# Patient Record
Sex: MALE | Race: WHITE | HISPANIC OR LATINO | Employment: STUDENT | URBAN - METROPOLITAN AREA
[De-identification: names, ages, dates, MRNs, and addresses within clinical notes are randomized per-mention and may not be internally consistent; named-entity substitution may affect disease eponyms.]

---

## 2017-05-22 ENCOUNTER — TELEPHONE (OUTPATIENT)
Dept: FAMILY MEDICINE | Facility: CLINIC | Age: 22
End: 2017-05-22

## 2017-05-22 DIAGNOSIS — F41.8 DEPRESSION WITH ANXIETY: Primary | ICD-10-CM

## 2017-05-22 NOTE — LETTER
My Depression Action Plan  Name: Jonathan Lacey   Date of Birth 1995  Date: 5/22/2017    My doctor: Ty Franks   My clinic: [address] [address] [phone]          GREEN    ZONE   Good Control    What it looks like:     Things are going generally well. You have normal up s and down s. You may even feel depressed from time to time, but bad moods usually last less than a day.   What you need to do:  1. Continue to care for yourself (see self care plan)  2. Check your depression survival kit and update it as needed  3. Follow your physician s recommendations including any medication.  4. Do not stop taking medication unless you consult with your physician first.           YELLOW         ZONE Getting Worse    What it looks like:     Depression is starting to interfere with your life.     It may be hard to get out of bed; you may be starting to isolate yourself from others.    Symptoms of depression are starting to last most all day and this has happened for several days.     You may have suicidal thoughts but they are not constant.   What you need to do:     1. Call your care team, your response to treatment will improve if you keep your care team informed of your progress. Yellow periods are signs an adjustment may need to be made.     2. Continue your self-care, even if you have to fake it!    3. Talk to someone in your support network    4. Open up your depression survival kit           RED    ZONE Medical Alert - Get Help    What it looks like:     Depression is seriously interfering with your life.     You may experience these or other symptoms: You can t get out of bed most days, can t work or engage in other necessary activities, you have trouble taking care of basic hygiene, or basic responsibilities, thoughts of suicide or death that will not go away, self-injurious behavior.     What you need to do:  1. Call your care team  and request a same-day appointment. If they are not available (weekends or after hours) call your local crisis line, emergency room or 911.      Electronically signed by: Padmini Medellin, May 22, 2017    Depression Self Care Plan / Survival Kit    Self-Care for Depression  Here s the deal. Your body and mind are really not as separate as most people think.  What you do and think affects how you feel and how you feel influences what you do and think. This means if you do things that people who feel good do, it will help you feel better.  Sometimes this is all it takes.  There is also a place for medication and therapy depending on how severe your depression is, so be sure to consult with your medical provider and/ or Behavioral Health Consultant if your symptoms are worsening or not improving.     In order to better manage my stress, I will:    Exercise  Get some form of exercise, every day. This will help reduce pain and release endorphins, the  feel good  chemicals in your brain. This is almost as good as taking antidepressants!  This is not the same as joining a gym and then never going! (they count on that by the way ) It can be as simple as just going for a walk or doing some gardening, anything that will get you moving.      Hygiene   Maintain good hygiene (Get out of bed in the morning, Make your bed, Brush your teeth, Take a shower, and Get dressed like you were going to work, even if you are unemployed).  If your clothes don't fit try to get ones that do.    Diet  I will strive to eat foods that are good for me, drink plenty of water, and avoid excessive sugar, caffeine, alcohol, and other mood-altering substances.  Some foods that are helpful in depression are: complex carbohydrates, B vitamins, flaxseed, fish or fish oil, fresh fruits and vegetables.    Psychotherapy  I agree to participate in Individual Therapy (if recommended).    Medication  If prescribed medications, I agree to take them.  Missing doses  can result in serious side effects.  I understand that drinking alcohol, or other illicit drug use, may cause potential side effects.  I will not stop my medication abruptly without first discussing it with my provider.    Staying Connected With Others  I will stay in touch with my friends, family members, and my primary care provider/team.    Use your imagination  Be creative.  We all have a creative side; it doesn t matter if it s oil painting, sand castles, or mud pies! This will also kick up the endorphins.    Witness Beauty  (AKA stop and smell the roses) Take a look outside, even in mid-winter. Notice colors, textures. Watch the squirrels and birds.     Service to others  Be of service to others.  There is always someone else in need.  By helping others we can  get out of ourselves  and remember the really important things.  This also provides opportunities for practicing all the other parts of the program.    Humor  Laugh and be silly!  Adjust your TV habits for less news and crime-drama and more comedy.    Control your stress  Try breathing deep, massage therapy, biofeedback, and meditation. Find time to relax each day.     My support system    Clinic Contact:  Phone number:    Contact 1:  Phone number:    Contact 2:  Phone number:    Oriental orthodox/:  Phone number:    Therapist:  Phone number:    Local crisis center:    Phone number:    Other community support:  Phone number:

## 2017-05-22 NOTE — TELEPHONE ENCOUNTER
Phone, spoke to patient.  Panel Management Review      Patient has the following on his problem list:     Depression / Dysthymia review  PHQ-9 SCORE 12/8/2014 11/2/2015 5/22/2017   Total Score 14 - -   Total Score - 23 4      Patient is due for:  PHQ9 and DAP      Composite cancer screening  Chart review shows that this patient is due/due soon for the following None  Summary:    Patient is due/failing the following:   DAP and PHQ9    Action needed:   Patient needs office visit for depression pt states he goes to [place] for his depression. and Patient needs to do PHQ9.    Type of outreach:and he now goes to [facility] for his depression. he did do a [id number], spoke to patient.  and he now goes to [facility] for his depression. he did do a PHQ9    Questions for provider review:    None                                                                                           Chart routed to Care Team .

## 2017-05-23 ASSESSMENT — PATIENT HEALTH QUESTIONNAIRE - PHQ9: SUM OF ALL RESPONSES TO PHQ QUESTIONS 1-9: 4

## 2022-01-01 ENCOUNTER — HOSPITAL ENCOUNTER (EMERGENCY)
Facility: CLINIC | Age: 27
Discharge: HOME | End: 2022-07-23
Attending: EMERGENCY MEDICINE | Admitting: EMERGENCY MEDICINE
Payer: COMMERCIAL

## 2022-01-01 ENCOUNTER — APPOINTMENT (OUTPATIENT)
Dept: CT IMAGING | Facility: CLINIC | Age: 27
End: 2022-01-01
Attending: EMERGENCY MEDICINE
Payer: COMMERCIAL

## 2022-01-01 ENCOUNTER — OFFICE VISIT (OUTPATIENT)
Dept: FAMILY MEDICINE | Facility: CLINIC | Age: 27
End: 2022-01-01
Payer: COMMERCIAL

## 2022-01-01 VITALS
OXYGEN SATURATION: 100 % | HEART RATE: 89 BPM | BODY MASS INDEX: 30.99 KG/M2 | SYSTOLIC BLOOD PRESSURE: 134 MMHG | RESPIRATION RATE: 20 BRPM | TEMPERATURE: 97.9 F | DIASTOLIC BLOOD PRESSURE: 97 MMHG | WEIGHT: 206.8 LBS

## 2022-01-01 VITALS
RESPIRATION RATE: 18 BRPM | DIASTOLIC BLOOD PRESSURE: 82 MMHG | WEIGHT: 204.4 LBS | BODY MASS INDEX: 30.63 KG/M2 | TEMPERATURE: 97 F | HEART RATE: 80 BPM | SYSTOLIC BLOOD PRESSURE: 116 MMHG

## 2022-01-01 DIAGNOSIS — F40.01 PANIC DISORDER WITH AGORAPHOBIA AND MODERATE PANIC ATTACKS: ICD-10-CM

## 2022-01-01 DIAGNOSIS — G44.229 CHRONIC TENSION-TYPE HEADACHE, NOT INTRACTABLE: Primary | ICD-10-CM

## 2022-01-01 DIAGNOSIS — F33.1 MODERATE EPISODE OF RECURRENT MAJOR DEPRESSIVE DISORDER (H): Primary | ICD-10-CM

## 2022-01-01 DIAGNOSIS — R51.9 ACUTE NONINTRACTABLE HEADACHE, UNSPECIFIED HEADACHE TYPE: ICD-10-CM

## 2022-01-01 DIAGNOSIS — F41.9 ANXIETY: ICD-10-CM

## 2022-01-01 DIAGNOSIS — G89.29 CHRONIC RIGHT-SIDED LOW BACK PAIN WITHOUT SCIATICA: ICD-10-CM

## 2022-01-01 DIAGNOSIS — E61.1 IRON DEFICIENCY: ICD-10-CM

## 2022-01-01 DIAGNOSIS — M54.50 CHRONIC RIGHT-SIDED LOW BACK PAIN WITHOUT SCIATICA: ICD-10-CM

## 2022-01-01 LAB
ANION GAP SERPL CALCULATED.3IONS-SCNC: 8 MMOL/L (ref 3–14)
BASOPHILS # BLD AUTO: 0 10E3/UL (ref 0–0.2)
BASOPHILS NFR BLD AUTO: 0 %
BUN SERPL-MCNC: 10 MG/DL (ref 7–30)
CALCIUM SERPL-MCNC: 9.3 MG/DL (ref 8.5–10.1)
CHLORIDE BLD-SCNC: 103 MMOL/L (ref 94–109)
CO2 SERPL-SCNC: 28 MMOL/L (ref 20–32)
CREAT SERPL-MCNC: 0.8 MG/DL (ref 0.66–1.25)
EOSINOPHIL # BLD AUTO: 0 10E3/UL (ref 0–0.7)
EOSINOPHIL NFR BLD AUTO: 0 %
ERYTHROCYTE [DISTWIDTH] IN BLOOD BY AUTOMATED COUNT: 12.1 % (ref 10–15)
GFR SERPL CREATININE-BSD FRML MDRD: >90 ML/MIN/1.73M2
GLUCOSE BLD-MCNC: 103 MG/DL (ref 70–99)
HCT VFR BLD AUTO: 41.6 % (ref 40–53)
HGB BLD-MCNC: 14.6 G/DL (ref 13.3–17.7)
IGA SERPL-MCNC: 160 MG/DL (ref 84–499)
IMM GRANULOCYTES # BLD: 0 10E3/UL
IMM GRANULOCYTES NFR BLD: 0 %
IRON SATN MFR SERPL: 15 % (ref 15–46)
IRON SERPL-MCNC: 44 UG/DL (ref 35–180)
LYMPHOCYTES # BLD AUTO: 2.8 10E3/UL (ref 0.8–5.3)
LYMPHOCYTES NFR BLD AUTO: 26 %
MCH RBC QN AUTO: 29.1 PG (ref 26.5–33)
MCHC RBC AUTO-ENTMCNC: 35.1 G/DL (ref 31.5–36.5)
MCV RBC AUTO: 83 FL (ref 78–100)
MONOCYTES # BLD AUTO: 0.7 10E3/UL (ref 0–1.3)
MONOCYTES NFR BLD AUTO: 6 %
NEUTROPHILS # BLD AUTO: 7.4 10E3/UL (ref 1.6–8.3)
NEUTROPHILS NFR BLD AUTO: 68 %
NRBC # BLD AUTO: 0 10E3/UL
NRBC BLD AUTO-RTO: 0 /100
PLATELET # BLD AUTO: 377 10E3/UL (ref 150–450)
POTASSIUM BLD-SCNC: 3.6 MMOL/L (ref 3.4–5.3)
RBC # BLD AUTO: 5.02 10E6/UL (ref 4.4–5.9)
SODIUM SERPL-SCNC: 139 MMOL/L (ref 133–144)
TIBC SERPL-MCNC: 285 UG/DL (ref 240–430)
TTG IGA SER-ACNC: 0.3 U/ML
TTG IGG SER-ACNC: <0.6 U/ML
VIT B12 SERPL-MCNC: 309 PG/ML (ref 232–1245)
WBC # BLD AUTO: 11 10E3/UL (ref 4–11)

## 2022-01-01 PROCEDURE — 99285 EMERGENCY DEPT VISIT HI MDM: CPT | Mod: 25 | Performed by: EMERGENCY MEDICINE

## 2022-01-01 PROCEDURE — 250N000013 HC RX MED GY IP 250 OP 250 PS 637: Performed by: EMERGENCY MEDICINE

## 2022-01-01 PROCEDURE — 85025 COMPLETE CBC W/AUTO DIFF WBC: CPT | Performed by: EMERGENCY MEDICINE

## 2022-01-01 PROCEDURE — 82784 ASSAY IGA/IGD/IGG/IGM EACH: CPT | Performed by: PHYSICIAN ASSISTANT

## 2022-01-01 PROCEDURE — 83550 IRON BINDING TEST: CPT | Performed by: PHYSICIAN ASSISTANT

## 2022-01-01 PROCEDURE — 250N000011 HC RX IP 250 OP 636: Performed by: EMERGENCY MEDICINE

## 2022-01-01 PROCEDURE — 70450 CT HEAD/BRAIN W/O DYE: CPT

## 2022-01-01 PROCEDURE — 99214 OFFICE O/P EST MOD 30 MIN: CPT | Performed by: PHYSICIAN ASSISTANT

## 2022-01-01 PROCEDURE — 36415 COLL VENOUS BLD VENIPUNCTURE: CPT | Performed by: EMERGENCY MEDICINE

## 2022-01-01 PROCEDURE — 96375 TX/PRO/DX INJ NEW DRUG ADDON: CPT | Performed by: EMERGENCY MEDICINE

## 2022-01-01 PROCEDURE — 82310 ASSAY OF CALCIUM: CPT | Performed by: EMERGENCY MEDICINE

## 2022-01-01 PROCEDURE — 99284 EMERGENCY DEPT VISIT MOD MDM: CPT | Performed by: EMERGENCY MEDICINE

## 2022-01-01 PROCEDURE — 96374 THER/PROPH/DIAG INJ IV PUSH: CPT | Performed by: EMERGENCY MEDICINE

## 2022-01-01 PROCEDURE — 82607 VITAMIN B-12: CPT | Performed by: PHYSICIAN ASSISTANT

## 2022-01-01 PROCEDURE — 36415 COLL VENOUS BLD VENIPUNCTURE: CPT | Performed by: PHYSICIAN ASSISTANT

## 2022-01-01 PROCEDURE — 86364 TISS TRNSGLTMNASE EA IG CLAS: CPT | Performed by: PHYSICIAN ASSISTANT

## 2022-01-01 RX ORDER — DIPHENHYDRAMINE HYDROCHLORIDE 50 MG/ML
25 INJECTION INTRAMUSCULAR; INTRAVENOUS ONCE
Status: COMPLETED | OUTPATIENT
Start: 2022-01-01 | End: 2022-01-01

## 2022-01-01 RX ORDER — RIZATRIPTAN BENZOATE 5 MG/1
5-10 TABLET ORAL
Qty: 30 TABLET | Refills: 1 | Status: SHIPPED | OUTPATIENT
Start: 2022-01-01 | End: 2023-01-01

## 2022-01-01 RX ORDER — GABAPENTIN 300 MG/1
300 CAPSULE ORAL DAILY
Qty: 90 CAPSULE | Refills: 3 | Status: SHIPPED | OUTPATIENT
Start: 2022-01-01 | End: 2023-01-01

## 2022-01-01 RX ORDER — AMITRIPTYLINE HYDROCHLORIDE 50 MG/1
TABLET ORAL
Qty: 170 TABLET | Refills: 0 | Status: SHIPPED | OUTPATIENT
Start: 2022-01-01 | End: 2023-01-01

## 2022-01-01 RX ORDER — LORAZEPAM 0.5 MG/1
0.5 TABLET ORAL ONCE
Status: COMPLETED | OUTPATIENT
Start: 2022-01-01 | End: 2022-01-01

## 2022-01-01 RX ORDER — METOCLOPRAMIDE HYDROCHLORIDE 5 MG/ML
5 INJECTION INTRAMUSCULAR; INTRAVENOUS ONCE
Status: COMPLETED | OUTPATIENT
Start: 2022-01-01 | End: 2022-01-01

## 2022-01-01 RX ADMIN — DIPHENHYDRAMINE HYDROCHLORIDE 25 MG: 50 INJECTION, SOLUTION INTRAMUSCULAR; INTRAVENOUS at 14:55

## 2022-01-01 RX ADMIN — LORAZEPAM 0.5 MG: 0.5 TABLET ORAL at 14:55

## 2022-01-01 RX ADMIN — METOCLOPRAMIDE HYDROCHLORIDE 5 MG: 5 INJECTION INTRAMUSCULAR; INTRAVENOUS at 14:53

## 2022-01-01 ASSESSMENT — ANXIETY QUESTIONNAIRES
GAD7 TOTAL SCORE: 14
GAD7 TOTAL SCORE: 14
7. FEELING AFRAID AS IF SOMETHING AWFUL MIGHT HAPPEN: MORE THAN HALF THE DAYS
IF YOU CHECKED OFF ANY PROBLEMS ON THIS QUESTIONNAIRE, HOW DIFFICULT HAVE THESE PROBLEMS MADE IT FOR YOU TO DO YOUR WORK, TAKE CARE OF THINGS AT HOME, OR GET ALONG WITH OTHER PEOPLE: SOMEWHAT DIFFICULT
1. FEELING NERVOUS, ANXIOUS, OR ON EDGE: NEARLY EVERY DAY
2. NOT BEING ABLE TO STOP OR CONTROL WORRYING: MORE THAN HALF THE DAYS
GAD7 TOTAL SCORE: 14
7. FEELING AFRAID AS IF SOMETHING AWFUL MIGHT HAPPEN: MORE THAN HALF THE DAYS
3. WORRYING TOO MUCH ABOUT DIFFERENT THINGS: MORE THAN HALF THE DAYS
4. TROUBLE RELAXING: MORE THAN HALF THE DAYS
6. BECOMING EASILY ANNOYED OR IRRITABLE: SEVERAL DAYS
8. IF YOU CHECKED OFF ANY PROBLEMS, HOW DIFFICULT HAVE THESE MADE IT FOR YOU TO DO YOUR WORK, TAKE CARE OF THINGS AT HOME, OR GET ALONG WITH OTHER PEOPLE?: SOMEWHAT DIFFICULT
5. BEING SO RESTLESS THAT IT IS HARD TO SIT STILL: MORE THAN HALF THE DAYS

## 2022-01-01 ASSESSMENT — PAIN SCALES - GENERAL: PAINLEVEL: MODERATE PAIN (4)

## 2022-01-01 ASSESSMENT — PATIENT HEALTH QUESTIONNAIRE - PHQ9
SUM OF ALL RESPONSES TO PHQ QUESTIONS 1-9: 15
SUM OF ALL RESPONSES TO PHQ QUESTIONS 1-9: 15
10. IF YOU CHECKED OFF ANY PROBLEMS, HOW DIFFICULT HAVE THESE PROBLEMS MADE IT FOR YOU TO DO YOUR WORK, TAKE CARE OF THINGS AT HOME, OR GET ALONG WITH OTHER PEOPLE: VERY DIFFICULT

## 2022-06-28 ENCOUNTER — OFFICE VISIT (OUTPATIENT)
Dept: FAMILY MEDICINE | Facility: CLINIC | Age: 27
End: 2022-06-28
Payer: COMMERCIAL

## 2022-06-28 VITALS
HEIGHT: 69 IN | RESPIRATION RATE: 18 BRPM | HEART RATE: 86 BPM | DIASTOLIC BLOOD PRESSURE: 76 MMHG | TEMPERATURE: 98.8 F | BODY MASS INDEX: 31.52 KG/M2 | SYSTOLIC BLOOD PRESSURE: 114 MMHG | WEIGHT: 212.8 LBS

## 2022-06-28 DIAGNOSIS — R06.83 SNORING: ICD-10-CM

## 2022-06-28 DIAGNOSIS — M54.50 CHRONIC RIGHT-SIDED LOW BACK PAIN WITHOUT SCIATICA: ICD-10-CM

## 2022-06-28 DIAGNOSIS — G89.29 CHRONIC RIGHT-SIDED LOW BACK PAIN WITHOUT SCIATICA: ICD-10-CM

## 2022-06-28 DIAGNOSIS — G44.229 CHRONIC TENSION-TYPE HEADACHE, NOT INTRACTABLE: Primary | ICD-10-CM

## 2022-06-28 PROBLEM — F40.10 SOCIAL ANXIETY DISORDER: Status: ACTIVE | Noted: 2017-01-02

## 2022-06-28 PROBLEM — F40.01 PANIC DISORDER WITH AGORAPHOBIA AND MODERATE PANIC ATTACKS: Status: ACTIVE | Noted: 2018-01-29

## 2022-06-28 PROBLEM — F33.1 MODERATE EPISODE OF RECURRENT MAJOR DEPRESSIVE DISORDER (H): Status: ACTIVE | Noted: 2018-01-29

## 2022-06-28 LAB
ALBUMIN SERPL-MCNC: 4.1 G/DL (ref 3.4–5)
ALP SERPL-CCNC: 71 U/L (ref 40–150)
ALT SERPL W P-5'-P-CCNC: 30 U/L (ref 0–70)
ANION GAP SERPL CALCULATED.3IONS-SCNC: 3 MMOL/L (ref 3–14)
AST SERPL W P-5'-P-CCNC: 13 U/L (ref 0–45)
BILIRUB SERPL-MCNC: 0.2 MG/DL (ref 0.2–1.3)
BUN SERPL-MCNC: 8 MG/DL (ref 7–30)
CALCIUM SERPL-MCNC: 9 MG/DL (ref 8.5–10.1)
CHLORIDE BLD-SCNC: 105 MMOL/L (ref 94–109)
CO2 SERPL-SCNC: 32 MMOL/L (ref 20–32)
CREAT SERPL-MCNC: 0.91 MG/DL (ref 0.66–1.25)
ERYTHROCYTE [DISTWIDTH] IN BLOOD BY AUTOMATED COUNT: 12.6 % (ref 10–15)
FERRITIN SERPL-MCNC: 22 NG/ML (ref 26–388)
GFR SERPL CREATININE-BSD FRML MDRD: >90 ML/MIN/1.73M2
GLUCOSE BLD-MCNC: 87 MG/DL (ref 70–99)
HCT VFR BLD AUTO: 44.1 % (ref 40–53)
HGB BLD-MCNC: 15.1 G/DL (ref 13.3–17.7)
MCH RBC QN AUTO: 29 PG (ref 26.5–33)
MCHC RBC AUTO-ENTMCNC: 34.2 G/DL (ref 31.5–36.5)
MCV RBC AUTO: 85 FL (ref 78–100)
PLATELET # BLD AUTO: 324 10E3/UL (ref 150–450)
POTASSIUM BLD-SCNC: 4.5 MMOL/L (ref 3.4–5.3)
PROT SERPL-MCNC: 7.1 G/DL (ref 6.8–8.8)
RBC # BLD AUTO: 5.21 10E6/UL (ref 4.4–5.9)
SODIUM SERPL-SCNC: 140 MMOL/L (ref 133–144)
TSH SERPL DL<=0.005 MIU/L-ACNC: 1.61 MU/L (ref 0.4–4)
WBC # BLD AUTO: 7.9 10E3/UL (ref 4–11)

## 2022-06-28 PROCEDURE — 82728 ASSAY OF FERRITIN: CPT | Performed by: PHYSICIAN ASSISTANT

## 2022-06-28 PROCEDURE — 85027 COMPLETE CBC AUTOMATED: CPT | Performed by: PHYSICIAN ASSISTANT

## 2022-06-28 PROCEDURE — 84443 ASSAY THYROID STIM HORMONE: CPT | Performed by: PHYSICIAN ASSISTANT

## 2022-06-28 PROCEDURE — 82306 VITAMIN D 25 HYDROXY: CPT | Performed by: PHYSICIAN ASSISTANT

## 2022-06-28 PROCEDURE — 99204 OFFICE O/P NEW MOD 45 MIN: CPT | Performed by: PHYSICIAN ASSISTANT

## 2022-06-28 PROCEDURE — 36415 COLL VENOUS BLD VENIPUNCTURE: CPT | Performed by: PHYSICIAN ASSISTANT

## 2022-06-28 PROCEDURE — 80053 COMPREHEN METABOLIC PANEL: CPT | Performed by: PHYSICIAN ASSISTANT

## 2022-06-28 RX ORDER — LAMOTRIGINE 100 MG/1
100 TABLET ORAL DAILY
COMMUNITY
Start: 2022-06-19

## 2022-06-28 RX ORDER — AMITRIPTYLINE HYDROCHLORIDE 10 MG/1
TABLET ORAL
Qty: 90 TABLET | Refills: 1 | Status: SHIPPED | OUTPATIENT
Start: 2022-06-28 | End: 2022-01-01

## 2022-06-28 RX ORDER — ZOLPIDEM TARTRATE 10 MG/1
10 TABLET ORAL AT BEDTIME
COMMUNITY
Start: 2022-06-02

## 2022-06-28 RX ORDER — GABAPENTIN 300 MG/1
300 CAPSULE ORAL DAILY
COMMUNITY
Start: 2022-04-04 | End: 2022-01-01

## 2022-06-28 RX ORDER — MELOXICAM 15 MG/1
15 TABLET ORAL DAILY
Qty: 90 TABLET | Refills: 3 | Status: SHIPPED | OUTPATIENT
Start: 2022-06-28 | End: 2023-01-01 | Stop reason: ALTCHOICE

## 2022-06-28 RX ORDER — HYDROXYZINE HYDROCHLORIDE 50 MG/1
50 TABLET, FILM COATED ORAL PRN
COMMUNITY
Start: 2022-06-13

## 2022-06-28 RX ORDER — CYCLOBENZAPRINE HCL 10 MG
10 TABLET ORAL PRN
COMMUNITY
Start: 2022-05-17 | End: 2022-06-28

## 2022-06-28 RX ORDER — OLANZAPINE 2.5 MG/1
2.5 TABLET, FILM COATED ORAL AT BEDTIME
COMMUNITY
Start: 2022-06-19

## 2022-06-28 RX ORDER — CYCLOBENZAPRINE HCL 10 MG
10 TABLET ORAL 3 TIMES DAILY PRN
Qty: 180 TABLET | Refills: 3 | Status: SHIPPED | OUTPATIENT
Start: 2022-06-28 | End: 2023-01-01

## 2022-06-28 RX ORDER — LORAZEPAM 0.5 MG/1
0.5 TABLET ORAL DAILY
COMMUNITY
Start: 2022-06-03

## 2022-06-28 RX ORDER — DEXTROAMPHETAMINE SACCHARATE, AMPHETAMINE ASPARTATE, DEXTROAMPHETAMINE SULFATE AND AMPHETAMINE SULFATE 5; 5; 5; 5 MG/1; MG/1; MG/1; MG/1
20 TABLET ORAL 2 TIMES DAILY
COMMUNITY
Start: 2022-06-09

## 2022-06-28 RX ORDER — ONDANSETRON 4 MG/1
4 TABLET, FILM COATED ORAL PRN
COMMUNITY
Start: 2022-05-10

## 2022-06-28 RX ORDER — MELOXICAM 15 MG/1
15 TABLET ORAL DAILY
COMMUNITY
Start: 2022-05-17 | End: 2022-06-28

## 2022-06-28 ASSESSMENT — ANXIETY QUESTIONNAIRES
GAD7 TOTAL SCORE: 21
1. FEELING NERVOUS, ANXIOUS, OR ON EDGE: NEARLY EVERY DAY
2. NOT BEING ABLE TO STOP OR CONTROL WORRYING: NEARLY EVERY DAY
7. FEELING AFRAID AS IF SOMETHING AWFUL MIGHT HAPPEN: NEARLY EVERY DAY
7. FEELING AFRAID AS IF SOMETHING AWFUL MIGHT HAPPEN: NEARLY EVERY DAY
4. TROUBLE RELAXING: NEARLY EVERY DAY
8. IF YOU CHECKED OFF ANY PROBLEMS, HOW DIFFICULT HAVE THESE MADE IT FOR YOU TO DO YOUR WORK, TAKE CARE OF THINGS AT HOME, OR GET ALONG WITH OTHER PEOPLE?: EXTREMELY DIFFICULT
GAD7 TOTAL SCORE: 21
5. BEING SO RESTLESS THAT IT IS HARD TO SIT STILL: NEARLY EVERY DAY
GAD7 TOTAL SCORE: 21
3. WORRYING TOO MUCH ABOUT DIFFERENT THINGS: NEARLY EVERY DAY
6. BECOMING EASILY ANNOYED OR IRRITABLE: NEARLY EVERY DAY

## 2022-06-28 ASSESSMENT — ENCOUNTER SYMPTOMS
BACK PAIN: 1
HEADACHES: 1

## 2022-06-28 ASSESSMENT — PATIENT HEALTH QUESTIONNAIRE - PHQ9
SUM OF ALL RESPONSES TO PHQ QUESTIONS 1-9: 7
10. IF YOU CHECKED OFF ANY PROBLEMS, HOW DIFFICULT HAVE THESE PROBLEMS MADE IT FOR YOU TO DO YOUR WORK, TAKE CARE OF THINGS AT HOME, OR GET ALONG WITH OTHER PEOPLE: SOMEWHAT DIFFICULT
SUM OF ALL RESPONSES TO PHQ QUESTIONS 1-9: 7

## 2022-06-28 ASSESSMENT — PAIN SCALES - GENERAL: PAINLEVEL: MODERATE PAIN (4)

## 2022-06-28 NOTE — PATIENT INSTRUCTIONS
Start Amitriptyline for headaches.     Continue all other medications.     We will follow-up with lab work results.     Get sleep study scheduled.     Follow-up in 1 month for a recheck.

## 2022-06-28 NOTE — PROGRESS NOTES
Assessment & Plan   Chronic tension-type headache, not intractable  Patient with daily chronic headaches.  No red flag signs or symptoms.  I suspect that given his very severe anxiety these are related.  He has not been on any preventative medicine other than SSRIs for his anxiety.  We discussed amitriptyline and the patient is interested.  We will increase his dose over time.  No red flag signs or symptoms for head imaging at this point but we will try to ensure no other causes with basic lab work.  Patient also does snore loudly at night and so I think a sleep study is worthwhile.  We will follow-up in 1 month for recheck or sooner if symptoms or not improved.  - amitriptyline (ELAVIL) 10 MG tablet; Take 1 tablet (10 mg) by mouth At Bedtime for 7 days, THEN 2 tablets (20 mg) At Bedtime for 7 days, THEN 3 tablets (30 mg) At Bedtime for 14 days.  - CBC with platelets; Future  - Comprehensive metabolic panel; Future  - TSH with free T4 reflex; Future  - Ferritin; Future  - Vitamin D Deficiency; Future    Snoring  Longstanding history of snoring.  He has quite significant anxiety as well as daily headaches.  Perhaps there is some degree of sleep apnea.  Mother child states that he has been snoring since he was 6 years old.  Sleep study ordered to evaluate.  - Adult Sleep Eval & Management  Referral; Future    Chronic right-sided low back pain without sciatica  Recurrent intermittent chronic right-sided low back pain without radiation.  No radicular symptoms.  Uses meloxicam and Flexeril.  Not in entirely evaluated today given his other complaints but I do think that this is related to muscle weakness/spasm.  Encouraged to continue home exercise program.  Follow-up as needed for new concerning or worsening symptoms.  - cyclobenzaprine (FLEXERIL) 10 MG tablet; Take 1 tablet (10 mg) by mouth 3 times daily as needed for muscle spasms  - meloxicam (MOBIC) 15 MG tablet; Take 1 tablet (15 mg) by mouth daily    "  Tobacco Cessation:   reports that he has been smoking cigarettes. He has a 0.50 pack-year smoking history. He has never used smokeless tobacco.    BMI:   Estimated body mass index is 31.89 kg/m  as calculated from the following:    Height as of this encounter: 1.74 m (5' 8.5\").    Weight as of this encounter: 96.5 kg (212 lb 12.8 oz).     Return in about 4 weeks (around 7/26/2022) for In-Clinic Visit.    BRUCE [facility]    Lashell Castillo is a 26 year old accompanied by his mother., presenting for the following health issues:  Headache, Mental Health Problem, Back Pain, and back lump       Headache     Mental Health Problem  Associated symptoms include headaches.   Back Pain   Associated symptoms include headaches.   History of Present Illness       Back Pain:  He presents for follow up of back pain. Patient's back pain is a new problem.    Original cause of back pain: not sure  First noticed back pain: 1-4 weeks ago  Patient feels back pain: comes and goesLocation of back pain:  Right lower back and left lower back  Description of back pain: sharp  Back pain spreads: nowhere    Since patient first noticed back pain, pain is: unchanged  Does back pain interfere with his job:  Not applicable  On a scale of 1-10 (10 being the worst), patient describes pain as:  5  What makes back pain worse: sitting  Acupuncture: not tried  Acetaminophen: not tried  Activity or exercise: not tried  Chiropractor:  Not tried  Cold: not tried  Heat: not tried  Massage: not tried  Muscle relaxants: helpful  NSAIDS: helpful  Opioids: not tried  Physical Therapy: not tried  Rest: helpful  Steroid Injection: not tried  Stretching: not tried  Surgery: not tried  TENS unit: not tried  Topical pain relievers: not tried  Other healthcare providers patient is seeing for back pain: None    Mental Health Follow-up:  Patient presents to follow-up on Depression & Anxiety.Patient's depression since " "last visit has been:  Better  The patient is not having other symptoms associated with depression.  Patient's anxiety since last visit has been:  Bad  The patient is having other symptoms associated with anxiety.  Any significant life events: relationship concerns, job concerns, financial concerns, housing concerns, grief or loss, health concerns and other  Patient is feeling anxious or having panic attacks.  Patient has concerns about alcohol or drug use.    Headaches:   Since the patient's last clinic visit, headaches are: improved  The patient is getting headaches:  Every day  He is not able to do normal daily activities when he has a migraine.  The patient is taking the following rescue/relief medications:  No rescue/relief medications   Patient states \"I get no relief\" from the rescue/relief medications.   The patient is taking the following medications to prevent migraines:  No medications to prevent migraines  In the past 4 weeks, the patient has gone to an Urgent Care or Emergency Room 0 times times due to headaches.    Reason for visit:  Started off with migraines then looking for a new doctor. I also has a possible cyst on my back.  Symptom onset:  1-2 weeks ago  Symptoms include:  Severe headache and large lump on my back. Also, severe anxiety.  Symptom intensity:  Severe  Symptom progression:  Staying the same  Had these symptoms before:  Yes  Has tried/received treatment for these symptoms:  Yes  Previous treatment was successful:  No    He eats 2-3 servings of fruits and vegetables daily.He consumes 4 sweetened beverage(s) daily.He exercises with enough effort to increase his heart rate 20 to 29 minutes per day.  He exercises with enough effort to increase his heart rate 4 days per week. He is missing 2 dose(s) of medications per week.    Today's PHQ-9         PHQ-9 Total Score: 7    PHQ-9 Q9 Thoughts of better off dead/self-harm past 2 weeks :   Not at all    How difficult have these problems made it " "for you to do your work, take care of things at home, or get along with other people: Somewhat difficult  Today's LAZARA-7 Score: 21    Review of Systems   Musculoskeletal: Positive for back pain.   Neurological: Positive for headaches.         Objective    /76 (BP Location: Right arm, Patient Position: Sitting, Cuff Size: Adult Large)   Pulse 86   Temp 98.8  F (37.1  C) (Tympanic)   Resp 18   Ht 1.74 m (5' 8.5\")   Wt 96.5 kg (212 lb 12.8 oz)   BMI 31.89 kg/m    Body mass index is 31.89 kg/m .  Physical Exam   General: Constitutional: healthy, alert, and no distress  Head: Normocephalic. Atraumatic  Eyes: No conjunctival injection, sclera anicteric  Neck: supple, no asymmetry.   Respiratory: No resp distress.  Musculoskeletal: extremities normal- no gross deformities noted, and normal muscle tone. No midline tenderness. Right sided lumbar paraspinal muscle tenderness with spasm. Limited ROM of the spine due to pain.  Neurologic: Gait normal. CN 2-12 grossly intact. Strength 5/5 in the bilateral lower extremities including hip flexion and extension, knee flexion and extension and dorsi and plantar flexion. Patellar DTRs symmetric.   Psychiatric: mentation appears normal and affect normal/bright   Skin: No lesions or rashes visualized.       .  ..  "

## 2022-06-28 NOTE — LETTER
June 30, 2022      Jonathan Lacey  [address] [place] [id number]        Dear ,    We are writing to inform you of your test results.   Your lab work shows low vitamin D level as well as a low ferritin level which shows how much iron you are storing in your body.  While a low vitamin D level is pretty common especially in [place] I am unclear why you have a low ferritin level.  I think this does require further work-up but I think it is best to discuss it at his next appointment.  In the meantime I do recommend taking an iron supplement every day as well as a vitamin D supplement.  You can find these over-the-counter.  Sometimes iron supplements can be constipating so if you need to take it every other day due to constipation that would be okay. Or you can take an over-the-counter medication for constipation.       Resulted Orders   Vitamin D Deficiency   Result Value Ref Range    Vitamin D, Total (25-Hydroxy) 19 (L) 20 - 75 ug/L    Narrative    Season, race, dietary intake, and treatment affect the concentration of 25-hydroxy-Vitamin D. Values may decrease during winter months and increase during summer months. Values 20-29 ug/L may indicate Vitamin D insufficiency and values <20 ug/L may indicate Vitamin D deficiency.    Vitamin D determination is routinely performed by an immunoassay specific for 25 hydroxyvitamin D3.  If an individual is on vitamin D2(ergocalciferol) supplementation, please specify 25 OH vitamin D2 and D3 level determination by LCMSMS test VITD23.     Ferritin   Result Value Ref Range    Ferritin 22 (L) 26 - 388 ng/mL   TSH with free T4 reflex   Result Value Ref Range    TSH 1.61 0.40 - 4.00 mU/L   Comprehensive metabolic panel   Result Value Ref Range    Sodium 140 133 - 144 mmol/L    Potassium 4.5 3.4 - 5.3 mmol/L    Chloride 105 94 - 109 mmol/L    Carbon Dioxide (CO2) 32 20 - 32 mmol/L    Anion Gap 3 3 - 14 mmol/L    Urea Nitrogen 8 7 - 30 mg/dL    Creatinine 0.91  0.66 - 1.25 mg/dL    Calcium 9.0 8.5 - 10.1 mg/dL    Glucose 87 70 - 99 mg/dL    Alkaline Phosphatase 71 40 - 150 U/L    AST 13 0 - 45 U/L    ALT 30 0 - 70 U/L    Protein Total 7.1 6.8 - 8.8 g/dL    Albumin 4.1 3.4 - 5.0 g/dL    Bilirubin Total 0.2 0.2 - 1.3 mg/dL    GFR Estimate >90 >60 mL/min/1.73m2      Comment:      Effective December 21, 2021 eGFRcr in adults is calculated using the 2021 CKD-EPI creatinine equation which includes age and gender (Yun et al., NEJ, DOI: 10.1056/ZUUXds0447307)   CBC with platelets   Result Value Ref Range    WBC Count 7.9 4.0 - 11.0 10e3/uL    RBC Count 5.21 4.40 - 5.90 10e6/uL    Hemoglobin 15.1 13.3 - 17.7 g/dL    Hematocrit 44.1 40.0 - 53.0 %    MCV 85 78 - 100 fL    MCH 29.0 26.5 - 33.0 pg    MCHC 34.2 31.5 - 36.5 g/dL    RDW 12.6 10.0 - 15.0 %    Platelet Count 324 150 - 450 10e3/uL       If you have any questions or concerns, please call the clinic at the number listed above.       Sincerely,      Garrett Barth PA-C/steven

## 2022-06-29 PROBLEM — G89.29 CHRONIC RIGHT-SIDED LOW BACK PAIN WITHOUT SCIATICA: Status: ACTIVE | Noted: 2022-06-29

## 2022-06-29 PROBLEM — M54.50 CHRONIC RIGHT-SIDED LOW BACK PAIN WITHOUT SCIATICA: Status: ACTIVE | Noted: 2022-06-29

## 2022-06-29 PROBLEM — G44.229 CHRONIC TENSION-TYPE HEADACHE, NOT INTRACTABLE: Status: ACTIVE | Noted: 2022-06-29

## 2022-06-29 LAB — DEPRECATED CALCIDIOL+CALCIFEROL SERPL-MC: 19 UG/L (ref 20–75)

## 2022-07-23 NOTE — ED TRIAGE NOTES
Called 911 multiple times due to headache and believes he has a tumor. Is requesting MRI to rule out tumor.      Triage Assessment     Row Name 07/23/22 [id number]       Triage Assessment (Adult)    Airway WDL WDL       Respiratory WDL    Respiratory WDL WDL       Skin Circulation/Temperature WDL    Skin Circulation/Temperature WDL WDL       Cardiac WDL    Cardiac WDL WDL       Peripheral/Neurovascular WDL    Peripheral Neurovascular WDL WDL       Cognitive/Neuro/Behavioral WDL    Cognitive/Neuro/Behavioral WDL WDL

## 2022-07-23 NOTE — ED PROVIDER NOTES
"  History     Chief Complaint   Patient presents with     Headache     Anxiety     Not sleeping, coming in due to \"wanting MRI to R/O brain tumor\"     HPI  Jonathan Lacey is a 26 year old male with a past medical history significant for chronic tension headache.  Depression and anxiety learning disability who presents emergency department complaining of headache and anxiety.  Patient states he has had a almost daily headache for the past 5 months.  He states over the past few days it is worsened and and today it was very bad.  He is feeling very anxious about the fact that it is worsened and is concerned there is something wrong with his brain.  He denies any fevers or chills has not had any visual changes denies any neck pain has not had any chest pain or shortness of breath denies any abdominal pain or back pain has not any focal numbness weakness any extremity he denies any bowel or bladder dysfunction.    Allergies:  Allergies   Allergen Reactions     Haloperidol      Other reaction(s): Dystonia     Risperidone      Other reaction(s): Dystonia     Venlafaxine Other (See Comments)     Patient states \" severe serotonin effects\"       Problem List:    Patient Active Problem List    Diagnosis Date Noted     Chronic tension-type headache, not intractable 06/29/2022     Priority: Medium     Chronic right-sided low back pain without sciatica 06/29/2022     Priority: Medium     Moderate episode of recurrent major depressive disorder (H) 01/29/2018     Priority: Medium     Panic disorder with agoraphobia and moderate panic attacks 01/29/2018     Priority: Medium     Social anxiety disorder 01/02/2017     Priority: Medium     Attention deficit hyperactivity disorder (ADHD), combined type, severe 02/17/2016     Priority: Medium     Closed displaced fracture of neck of right fourth and fifth metacarpal bone 05/18/2015     Priority: Medium     Learning disability 12/27/2014     Priority: Medium     School failure 12/27/2014 "     Priority: Medium     Insomnia 12/27/2014     Priority: Medium     Drug abuse, marijuana 12/27/2014     Priority: Medium        Past Medical History:    Past Medical History:   Diagnosis Date     NO ACTIVE PROBLEMS (aka NONE)        Past Surgical History:    Past Surgical History:   Procedure Laterality Date     NO HISTORY OF SURGERY         Family History:    Family History   Problem Relation Age of Onset     Depression/Anxiety Father      Hypertension Father      Diabetes Maternal Grandmother      Hypertension Maternal Grandmother      Diabetes Paternal Grandmother      Hypertension Paternal Grandmother      Asthma No family hx of      Cancer - colorectal No family hx of      Ovarian Cancer No family hx of      Prostate Cancer No family hx of        Social History:  Marital Status:  Single [1]  Social History     Tobacco Use     Smoking status: Current Every Day Smoker     Packs/day: 0.50     Years: 1.00     Pack years: 0.50     Types: Cigarettes     Smokeless tobacco: Never Used   Vaping Use     Vaping Use: Every day     Substances: Nicotine   Substance Use Topics     Alcohol use: Not Currently     Comment: Weekly     Drug use: Not Currently     Types: Marijuana        Medications:    amitriptyline (ELAVIL) 10 MG tablet  amphetamine-dextroamphetamine (ADDERALL) 20 MG tablet  gabapentin (NEURONTIN) 300 MG capsule  hydrOXYzine (ATARAX) 50 MG tablet  lamoTRIgine (LAMICTAL) 100 MG tablet  LORazepam (ATIVAN) 0.5 MG tablet  meloxicam (MOBIC) 15 MG tablet  OLANZapine (ZYPREXA) 2.5 MG tablet  ondansetron (ZOFRAN) 4 MG tablet  zolpidem (AMBIEN) 10 MG tablet  cyclobenzaprine (FLEXERIL) 10 MG tablet          Review of Systems  All systems reviewed and other than pertinent positives and negatives in HPI all other systems are negative.  Physical Exam   BP: (!) 134/97  Pulse: 89  Temp: 97.9  F (36.6  C)  Resp: 20  Weight: 93.8 kg (206 lb 12.8 oz)  SpO2: 100 %      Physical Exam  Vitals and nursing note reviewed.    Constitutional:       Appearance: Normal appearance. He is not ill-appearing, toxic-appearing or diaphoretic.      Comments: He appears in mild generalized distress secondary to headache and anxiety.   HENT:      Head: Normocephalic and atraumatic.      Nose: Nose normal.      Mouth/Throat:      Mouth: Mucous membranes are moist.      Pharynx: Oropharynx is clear.   Eyes:      Conjunctiva/sclera: Conjunctivae normal.   Cardiovascular:      Rate and Rhythm: Normal rate and regular rhythm.      Pulses: Normal pulses.      Heart sounds: Normal heart sounds. No murmur heard.  Pulmonary:      Effort: Pulmonary effort is normal.      Breath sounds: Normal breath sounds. No stridor. No wheezing or rhonchi.   Abdominal:      General: Abdomen is flat. Bowel sounds are normal. There is no distension.      Palpations: Abdomen is soft.      Tenderness: There is no abdominal tenderness. There is no right CVA tenderness or left CVA tenderness.   Musculoskeletal:         General: No swelling or tenderness. Normal range of motion.      Cervical back: Normal range of motion and neck supple.      Right lower leg: No edema.      Left lower leg: No edema.   Skin:     General: Skin is warm and dry.      Capillary Refill: Capillary refill takes less than 2 seconds.      Findings: No rash.   Neurological:      General: No focal deficit present.      Mental Status: He is alert and oriented to person, place, and time.   Psychiatric:         Mood and Affect: Mood normal.      Comments: Patient appears anxious.  No homicidal or suicidal ideation at present.         ED Course                 Procedures              Critical Care time:  none               No results found for this or any previous visit (from the past 24 hour(s)).    Medications   metoclopramide (REGLAN) injection 5 mg (has no administration in time range)   diphenhydrAMINE (BENADRYL) injection 25 mg (has no administration in time range)       Assessments & Plan (with Medical  Decision Making) records were reviewed.  Patient was given Reglan and Benadryl for his headache.  Due to headache be slightly atypical a CT scan was ordered.  Patient complained of feeling very anxious and was given a dose of Ativan.  CBC was unremarkable.  Basic metabolic panel without abnormality.  CT scan of the head was obtained.  Patient at this point stated he his ride was here and he needed to leave.  He is alert and oriented x3 he denies homicidal or suicidal ideation.  I do not think I can hold him at this time and due to the fact the CT scan has not been assessed I did have him sign out AGAINST MEDICAL ADVICE.  I did state that I will call him with results if they are positive.  He is welcome to return at any time for further evaluation and care.  Patient is agreement this plan.  CT scan was unremarkable.     I have reviewed the nursing notes.    I have reviewed the findings, diagnosis, plan and need for follow up with the patient.       New Prescriptions    No medications on file       Final diagnoses:   Acute nonintractable headache, unspecified headache type   Anxiety       7/23/2022   [facility] EMERGENCY DEPT     Genaro Marks MD  07/25/22 [id number]

## 2022-07-26 NOTE — LETTER
July 29, 2022      Jonathan PRETTY Abhijit  [address] [place] [id number]        Dear ,    We are writing to inform you of your test results.    Labs are normal. Continue plan as discussed in clinic.    Resulted Orders   Vitamin B12   Result Value Ref Range    Vitamin B12 309 232 - 1,245 pg/mL   Iron and iron binding capacity   Result Value Ref Range    Iron 44 35 - 180 ug/dL    Iron Binding Capacity 285 240 - 430 ug/dL    Iron Sat Index 15 15 - 46 %   IgA   Result Value Ref Range    Immunoglobulin A 160 84 - 499 mg/dL   Tissue transglutaminase samy IgA and IgG   Result Value Ref Range    Tissue Transglutaminase Antibody IgA 0.3 <7.0 U/mL      Comment:      Negative- The tTG-IgA assay has limited utility for patients with decreased levels of IgA. Screening for celiac disease should include IgA testing to rule out selective IgA deficiency and to guide selection and interpretation of serological testing. tTG-IgG testing may be positive in celiac disease patients with IgA deficiency.    Tissue Transglutaminase Antibody IgG <0.6 <7.0 U/mL      Comment:      Negative       If you have any questions or concerns, please call the clinic at the number listed above.       Sincerely,      Garrett Barth PA-C

## 2022-07-26 NOTE — PROGRESS NOTES
"  Assessment & Plan   Chronic tension-type headache, not intractable  Still with daily headaches but not as severe since starting Amitriptyline. He is still on a relatively low dose. We will increase to 100 mg daily over the next few weeks. We will also start Maxalt for abortive therapy.  Pt will continue to work on stress reduction and coping skills. Follow-up in 3 months if stable, sooner if not.   - amitriptyline (ELAVIL) 50 MG tablet; Take 1 tablet (50 mg) by mouth At Bedtime for 7 days, THEN 1.5 tablets (75 mg) At Bedtime for 7 days, THEN 2 tablets (100 mg) At Bedtime for 76 days.  - rizatriptan (MAXALT) 5 MG tablet; Take 1-2 tablets (5-10 mg) by mouth at onset of headache for migraine May repeat in 2 hours. Max 6 tablets/24 hours.    Iron deficiency  Unclear cause. Ferritin was < 30. Will check for celiac disease, B12 deficiency, TIBC. Continue iron supplementation, as I think improving his iron could improve some of his mental health symptoms.   - Tissue transglutaminase samy IgA and IgG; Future  - IgA; Future  - Iron and iron binding capacity; Future  - Vitamin B12; Future     Nicotine/Tobacco Cessation:  He reports that he has been smoking cigarettes. He has a 0.50 pack-year smoking history. He has never used smokeless tobacco.  Nicotine/Tobacco Cessation Plan:   Information offered: Patient not interested at this time    BMI:   Estimated body mass index is 30.63 kg/m  as calculated from the following:    Height as of 6/28/22: 1.74 m (5' 8.5\").    Weight as of this encounter: 92.7 kg (204 lb 6.4 oz).     Return in about 4 weeks (around 8/23/2022), or if symptoms worsen or fail to improve, for In-Clinic Visit.    BRUCE [facility]    Lashell Castillo is a 26 year old, presenting for the following health issues:  Depression, Anxiety, Headache, and Back Pain      History of Present Illness       Back Pain:  He presents for follow up of back pain. Patient's back pain " "is a recurring problem.  Location of back pain:  Right lower back and left lower back  Description of back pain: cramping, sharp and stabbing  Back pain spreads: right side of neck    Since patient first noticed back pain, pain is: always present, but gets better and worse  Does back pain interfere with his job:  Not applicable      Mental Health Follow-up:  Patient presents to follow-up on Depression & Anxiety.Patient's depression since last visit has been:  Medium  The patient is not having other symptoms associated with depression.  Patient's anxiety since last visit has been:  No change  The patient is having other symptoms associated with anxiety.  Any significant life events: relationship concerns, job concerns, financial concerns, housing concerns and grief or loss  Patient is feeling anxious or having panic attacks.  Patient has no concerns about alcohol or drug use.    Headaches:   Since the patient's last clinic visit, headaches are: worsened  The patient is getting headaches:  Every day and it gets so hard to do daily things.  He is not able to do normal daily activities when he has a migraine.  The patient is taking the following rescue/relief medications:  Tylenol   Patient states \"I get no relief\" from the rescue/relief medications.   The patient is taking the following medications to prevent migraines:  Amitriptyline  In the past 4 weeks, the patient has gone to an Urgent Care or Emergency Room 0 times times due to headaches.    He eats 0-1 servings of fruits and vegetables daily.He consumes 4 sweetened beverage(s) daily.He exercises with enough effort to increase his heart rate 30 to 60 minutes per day.  He exercises with enough effort to increase his heart rate 5 days per week. He is missing 2 dose(s) of medications per week.    Today's PHQ-9         PHQ-9 Total Score: 15    PHQ-9 Q9 Thoughts of better off dead/self-harm past 2 weeks :   Not at all    How difficult have these problems made it for " you to do your work, take care of things at home, or get along with other people: Very difficult  Today's LAZARA-7 Score: 14     Review of Systems   See HPI       Objective    /82 (BP Location: Right arm, Patient Position: Sitting, Cuff Size: Adult Large)   Pulse 80   Temp 97  F (36.1  C) (Tympanic)   Resp 18   Wt 92.7 kg (204 lb 6.4 oz)   BMI 30.63 kg/m    Body mass index is 30.63 kg/m .  Physical Exam   Constitutional: healthy, alert, and no distress  Head: Normocephalic. Atraumatic  Eyes: No conjunctival injection, sclera anicteric  Respiratory: No resp distress.  Musculoskeletal: extremities normal- no gross deformities noted, and normal muscle tone  Neurologic: Gait normal. CN 2-12 grossly intact  Psychiatric: mentation appears normal and affect normal/bright       .  ..

## 2022-07-26 NOTE — PATIENT INSTRUCTIONS
Increase Amitriptyline to 100 mg as prescribed.     Start using Maxalt when you get headaches.     Continue iron daily or every other day, based on GI symptoms.     We will follow-up with the results of your additional lab work.     When you are ready to stop smoking, I can help with that.     If things are better and improved, then follow-up with me in 3 months. Otherwise, follow-up sooner with my colleagues.

## 2022-07-29 NOTE — TELEPHONE ENCOUNTER
Routing to ordering provider for consideration, not on refill protocol.           Peyton Astudillo     RN MSN

## 2023-01-01 ENCOUNTER — TELEPHONE (OUTPATIENT)
Dept: FAMILY MEDICINE | Facility: CLINIC | Age: 28
End: 2023-01-01

## 2023-01-01 ENCOUNTER — VIRTUAL VISIT (OUTPATIENT)
Dept: FAMILY MEDICINE | Facility: CLINIC | Age: 28
End: 2023-01-01
Payer: COMMERCIAL

## 2023-01-01 ENCOUNTER — OFFICE VISIT (OUTPATIENT)
Dept: PALLIATIVE MEDICINE | Facility: CLINIC | Age: 28
End: 2023-01-01
Attending: PHYSICIAN ASSISTANT
Payer: COMMERCIAL

## 2023-01-01 ENCOUNTER — HEALTH MAINTENANCE LETTER (OUTPATIENT)
Age: 28
End: 2023-01-01

## 2023-01-01 VITALS — HEART RATE: 97 BPM | SYSTOLIC BLOOD PRESSURE: 115 MMHG | DIASTOLIC BLOOD PRESSURE: 79 MMHG

## 2023-01-01 DIAGNOSIS — M54.50 CHRONIC RIGHT-SIDED LOW BACK PAIN WITHOUT SCIATICA: ICD-10-CM

## 2023-01-01 DIAGNOSIS — M79.A11 NON-TRAUMATIC COMPARTMENT SYNDROME OF RIGHT UPPER EXTREMITY: ICD-10-CM

## 2023-01-01 DIAGNOSIS — G89.4 CHRONIC PAIN SYNDROME: Primary | ICD-10-CM

## 2023-01-01 DIAGNOSIS — F23 BRIEF PSYCHOTIC DISORDER (H): ICD-10-CM

## 2023-01-01 DIAGNOSIS — F19.11 HISTORY OF SUBSTANCE ABUSE (H): ICD-10-CM

## 2023-01-01 DIAGNOSIS — M79.601 PAIN OF RIGHT UPPER EXTREMITY: ICD-10-CM

## 2023-01-01 DIAGNOSIS — M54.2 NECK PAIN: ICD-10-CM

## 2023-01-01 DIAGNOSIS — G89.29 CHRONIC RIGHT-SIDED LOW BACK PAIN WITHOUT SCIATICA: ICD-10-CM

## 2023-01-01 DIAGNOSIS — F40.01 PANIC DISORDER WITH AGORAPHOBIA AND MODERATE PANIC ATTACKS: ICD-10-CM

## 2023-01-01 DIAGNOSIS — G89.4 CHRONIC PAIN SYNDROME: ICD-10-CM

## 2023-01-01 DIAGNOSIS — G44.86 CERVICOGENIC HEADACHE: ICD-10-CM

## 2023-01-01 DIAGNOSIS — G44.229 CHRONIC TENSION-TYPE HEADACHE, NOT INTRACTABLE: ICD-10-CM

## 2023-01-01 DIAGNOSIS — M79.641 PAIN OF RIGHT HAND: ICD-10-CM

## 2023-01-01 DIAGNOSIS — R51.9 NONINTRACTABLE EPISODIC HEADACHE, UNSPECIFIED HEADACHE TYPE: ICD-10-CM

## 2023-01-01 DIAGNOSIS — R51.9 NONINTRACTABLE EPISODIC HEADACHE, UNSPECIFIED HEADACHE TYPE: Primary | ICD-10-CM

## 2023-01-01 DIAGNOSIS — M79.18 MYOFASCIAL PAIN: ICD-10-CM

## 2023-01-01 DIAGNOSIS — M54.2 NECK PAIN: Primary | ICD-10-CM

## 2023-01-01 PROCEDURE — 99215 OFFICE O/P EST HI 40 MIN: CPT | Mod: 93 | Performed by: PHYSICIAN ASSISTANT

## 2023-01-01 PROCEDURE — 99205 OFFICE O/P NEW HI 60 MIN: CPT

## 2023-01-01 PROCEDURE — 99214 OFFICE O/P EST MOD 30 MIN: CPT | Mod: VID | Performed by: PHYSICIAN ASSISTANT

## 2023-01-01 PROCEDURE — 99213 OFFICE O/P EST LOW 20 MIN: CPT | Mod: VID | Performed by: PHYSICIAN ASSISTANT

## 2023-01-01 RX ORDER — METHYLPREDNISOLONE 4 MG
TABLET, DOSE PACK ORAL
Qty: 21 TABLET | Refills: 0 | Status: SHIPPED | OUTPATIENT
Start: 2023-01-01 | End: 2023-01-01

## 2023-01-01 RX ORDER — GABAPENTIN 400 MG/1
400 CAPSULE ORAL 4 TIMES DAILY
Qty: 360 CAPSULE | Refills: 3 | Status: SHIPPED | OUTPATIENT
Start: 2023-01-01

## 2023-01-01 RX ORDER — RIZATRIPTAN BENZOATE 5 MG/1
5-10 TABLET ORAL
Qty: 30 TABLET | Refills: 6 | Status: SHIPPED | OUTPATIENT
Start: 2023-01-01

## 2023-01-01 RX ORDER — MELOXICAM 15 MG/1
15 TABLET ORAL DAILY
Qty: 90 TABLET | Refills: 3 | Status: CANCELLED | OUTPATIENT
Start: 2023-01-01

## 2023-01-01 RX ORDER — METHOCARBAMOL 500 MG/1
500-1000 TABLET, FILM COATED ORAL 4 TIMES DAILY PRN
Qty: 120 TABLET | Refills: 0 | Status: SHIPPED | OUTPATIENT
Start: 2023-01-01

## 2023-01-01 RX ORDER — AMITRIPTYLINE HYDROCHLORIDE 150 MG/1
150 TABLET ORAL AT BEDTIME
Qty: 90 TABLET | Refills: 3 | Status: SHIPPED | OUTPATIENT
Start: 2023-01-01

## 2023-01-01 RX ORDER — CYCLOBENZAPRINE HCL 10 MG
10 TABLET ORAL 3 TIMES DAILY PRN
Qty: 180 TABLET | Refills: 3 | Status: SHIPPED | OUTPATIENT
Start: 2023-01-01 | End: 2023-01-01

## 2023-01-01 RX ORDER — GABAPENTIN 400 MG/1
CAPSULE ORAL
Qty: 90 CAPSULE | Refills: 1 | Status: SHIPPED | OUTPATIENT
Start: 2023-01-01 | End: 2023-01-01

## 2023-01-01 RX ORDER — PROPRANOLOL HCL 60 MG
60 CAPSULE, EXTENDED RELEASE 24HR ORAL DAILY
Qty: 30 CAPSULE | Refills: 1 | Status: SHIPPED | OUTPATIENT
Start: 2023-01-01 | End: 2023-01-01

## 2023-01-01 RX ORDER — MELOXICAM 15 MG/1
15 TABLET ORAL DAILY
Qty: 30 TABLET | Refills: 0 | Status: SHIPPED | OUTPATIENT
Start: 2023-01-01 | End: 2023-07-14

## 2023-01-01 RX ORDER — GABAPENTIN 300 MG/1
300 CAPSULE ORAL DAILY
Qty: 90 CAPSULE | Refills: 3 | Status: CANCELLED | OUTPATIENT
Start: 2023-01-01

## 2023-01-01 RX ORDER — LIDOCAINE 50 MG/G
OINTMENT TOPICAL 2 TIMES DAILY PRN
Qty: 30 G | Refills: 1 | Status: SHIPPED | OUTPATIENT
Start: 2023-01-01 | End: 2023-01-01

## 2023-01-01 RX ORDER — CELECOXIB 200 MG/1
200 CAPSULE ORAL DAILY
Qty: 90 CAPSULE | Refills: 1 | Status: SHIPPED | OUTPATIENT
Start: 2023-01-01

## 2023-01-01 RX ORDER — PROPRANOLOL HCL 60 MG
CAPSULE, EXTENDED RELEASE 24HR ORAL
Qty: 30 CAPSULE | Refills: 5 | Status: SHIPPED | OUTPATIENT
Start: 2023-01-01

## 2023-01-01 RX ORDER — MELOXICAM 15 MG/1
15 TABLET ORAL DAILY
Qty: 30 TABLET | Refills: 0 | Status: SHIPPED | OUTPATIENT
Start: 2023-01-01 | End: 2023-01-01

## 2023-01-01 RX ORDER — AMITRIPTYLINE HYDROCHLORIDE 150 MG/1
150 TABLET ORAL AT BEDTIME
Qty: 30 TABLET | Refills: 1 | Status: SHIPPED | OUTPATIENT
Start: 2023-01-01 | End: 2023-01-01

## 2023-01-01 RX ORDER — RIZATRIPTAN BENZOATE 5 MG/1
5-10 TABLET ORAL
Qty: 30 TABLET | Refills: 1 | Status: SHIPPED | OUTPATIENT
Start: 2023-01-01 | End: 2023-01-01

## 2023-01-01 RX ORDER — LIDOCAINE 50 MG/G
OINTMENT TOPICAL 3 TIMES DAILY
Qty: 30 G | Refills: 1 | Status: SHIPPED | OUTPATIENT
Start: 2023-01-01

## 2023-01-01 RX ORDER — CELECOXIB 200 MG/1
200 CAPSULE ORAL DAILY
Qty: 30 CAPSULE | Refills: 1 | Status: SHIPPED | OUTPATIENT
Start: 2023-01-01 | End: 2023-01-01

## 2023-01-01 RX ORDER — CYCLOBENZAPRINE HCL 10 MG
10 TABLET ORAL 3 TIMES DAILY PRN
Qty: 90 TABLET | Refills: 0 | Status: SHIPPED | OUTPATIENT
Start: 2023-01-01

## 2023-01-01 ASSESSMENT — PAIN SCALES - GENERAL: PAINLEVEL: EXTREME PAIN (9)

## 2023-01-01 ASSESSMENT — ENCOUNTER SYMPTOMS
HEADACHES: 1
NECK PAIN: 1

## 2023-01-01 ASSESSMENT — PATIENT HEALTH QUESTIONNAIRE - PHQ9: SUM OF ALL RESPONSES TO PHQ QUESTIONS 1-9: 0

## 2023-01-04 PROBLEM — M79.A11 NON-TRAUMATIC COMPARTMENT SYNDROME OF RIGHT UPPER EXTREMITY: Status: ACTIVE | Noted: 2023-01-01

## 2023-01-04 PROBLEM — F23 BRIEF PSYCHOTIC DISORDER (H): Status: ACTIVE | Noted: 2023-01-01

## 2023-01-04 PROBLEM — G89.4 CHRONIC PAIN SYNDROME: Status: ACTIVE | Noted: 2023-01-01

## 2023-01-04 PROBLEM — F19.11 HISTORY OF SUBSTANCE ABUSE (H): Status: ACTIVE | Noted: 2023-01-01

## 2023-01-04 NOTE — PROGRESS NOTES
Jonathan is a 27 year old who is being evaluated via a billable telephone visit.      What phone number would you like to be contacted at? [phone]  How would you like to obtain your AVS?   Distant Location (provider location):  On-site     ASSESSMENT/PLAN:  (G89.4) Chronic pain syndrome  (primary encounter diagnosis)  (M79.A11) history non-traumatic compartment syndrome of right hand  Comment: Jonathan has history multiple psychoactive medications and some risks for serotonin syndrome.  He has multiple chronic pains.  He also has history of substance use/abuse.  Today he is asking about controlled substance though a lower risk controlled substance. We discussed that this does not seem to be his best option due to history and because we have better safer options to offer him.  Return to ortho and therapy.  Try alternative anti-inflammatory, increase meds for nerve pain (gabapentin, amitriptyline), and follow up in 1 mo.  I mentioned option of pain clinic given his multiple chronic pains and risk factors.  Plan: celecoxib (CELEBREX) 200 MG capsule, Orthopedic         Referral, Occupational Therapy         Referral    (M54.50,  G89.29) Chronic right-sided low back pain without sciatica  Comment: refill flexeril, see if gabapentin increase and medrol dosepak helpful.  Plan: cyclobenzaprine (FLEXERIL) 10 MG tablet,         celecoxib (CELEBREX) 200 MG capsule, gabapentin        (NEURONTIN) 400 MG capsule    (F40.01) Panic disorder with agoraphobia and moderate panic attacks  Comment: evaluation deferred, sees psychiatry.  Gabapentin is listed to include this attached diagnosis.  Plan: gabapentin (NEURONTIN) 400 MG capsule    (G44.229) Chronic tension-type headache, not intractable  Comment: not evaluated for accuracy of diagnosis today, however attempt better treatment via amitriptyline increase, gabapentin increase, rizatriptan increase, and medrol dosepak.  Plan: rizatriptan (MAXALT) 5 MG tablet, amitriptyline         (ELAVIL) 150 MG tablet, methylPREDNISolone         (MEDROL DOSEPAK) 4 MG tablet therapy pack    (F23) Brief psychotic disorder   Comment: patient indicates mental health doing well and he continues with psychiatry  Plan: discussed possible mental health ramifications of medrol dosepak    Gabapentin increase is at his request today.  Denies side effects with any of current meds but conservative increase today given amitriptyline increase today.    38 min in visit, 55 min total   Patient Instructions   Increase amitriptyline from 100 mg to 150 mg dose - will now be a single pill to take at night     Stop meloxicam.  Start celecoxib.  Take with food.  If getting stomach upset, reach out.    Increase gabapentin from 300 mg twice daily to 400 mg three times daily.  Discussed this is tiny dose compared to what we can do.    Try rizatriptan 2 tabs at a time to see if this is more effective.  Goal is to get rid of headache enough to not need further doses or as many doses later in day.  Long term this medication will likely stop working if taking 3 times a day every day.  Amitriptyline increase, gabapentin increase, and methylprednisolone hopefully will decrease headaches.    Methylprednisolone (medrol dose radha) - short term med that may break headaches.  Won't see benefit on day 1.  Can upset stomach.  Possible short term side effects: very hungry, very bell/irritable, feeling jittery, poor sleep.    Return to occupational therapy (perhaps a bit more hand specialized than Physical Therapy) and to your ortho specialist     Notify psych prescriber of med changes.  Also ask about cymbalta as medication to possibly help nerve pain.    Follow up 1 month     With increase in amitriptyline, gabapentin, and possible increase in rizatriptan - watch for serotonin syndrome   Please know there is a risk of serotonin syndrome.  Symptoms of serotonin syndrome: flushing, fever, very dry mouth, headache, tremor, confusion, agitation,  "stiff muscles, restlessness, enlarged pupils, unusual eye movements back and forth, rapid heart rate, nausea and vomiting, extreme reflexes, etc.  You need to be seen immediately if you develop these sort of symptoms, even if you do not have all of these symptoms.  This can be life threatening.          SUBJECTIVE:      Jonathan is new to me today.  He sees [facility] typically though has occasionally seen Gregorio here earlier this yr.    Notes he has compartment syndrome in his right hand.  This occurred last Jan.  Per records, this occurred after he prolonged sleeping/\"passing out\" from perocet use the night prior, and with urine tox showing THC and benzos (though no opiates).  His discharge summary also notes prior non traumatic rhabdomyolysis in 2019 but neither Jonathan nor his mother (present on phone) recall this.  He has not seen ortho since March and has no longer been seeing Physical Therapy or OT, though he states that was to be the plan.  He notes symptoms are much worse with cold.  Wondering about trying further medication, a medication his father uses - ultram.  Has tried flexeril, gabapentin (900 mg total), meloxicam for this pain.      Her record discharge summary he has history of ADHD, depression, benzodiazepine addiction in remission, MSSA prepatellar bursitis with associated severe sepsis, prior non-traumatic rhabdomyolysis in 2019.  Also per discharge summary, \"Seems most likely that he vaped something that caused severe intoxication/encephalopathy and acute lung injury.\"    He sees psychiatry.  He feels he has been stable and doing well.  Next psychiatry appt is later today.  He is on multiple psychoactive medications.  His med allergy list indicates history of \"severe serotonin effects\".    Not currently working.  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  He notes that he is out of work for other causes than his hand injury but that lifting would be bad.     He also has history of what " have been previously diagnosed as chronic tension headaches.  He takes rizaptriptan 5 mg three times daily for these.  He indicates that amitriptyline 100 mg has helped these headaches.    He also has chronic back pain.      Lashell Castillo is a 27 year old, presenting for the following health issues:  Pain    HPI     Pain History:  When did you first notice your pain? - Chronic Pain   Have you seen this provider for your pain in the past?   No   Where in your body do your have pain? Rt hand  Are you seeing anyone else for your pain? Yes - Gregorio Barth  What makes your pain better? nothing  What makes your pain worse?   How has pain affected your ability to work? Not currently working - unrelated to pain  Who lives in your household? parents    PHQ-9 SCORE 6/28/2022 7/26/2022 1/4/2023   PHQ-9 Total Score - - -   PHQ-9 Total Score MyChart 7 (Mild depression) 15 (Moderately severe depression) -   PHQ-9 Total Score 7 15 0       LAZARA-7 SCORE 11/2/2015 6/28/2022 7/26/2022   Total Score - - -   Total Score - 21 (severe anxiety) 14 (moderate anxiety)   Total Score 19 21 14         Objective           Vitals:  No vitals were obtained today due to virtual visit.      Phone call duration: 38 minutes

## 2023-01-04 NOTE — PATIENT INSTRUCTIONS
Increase amitriptyline from 100 mg to 150 mg dose - will now be a single pill to take at night     Stop meloxicam.  Start celecoxib.  Take with food.  If getting stomach upset, reach out.    Increase gabapentin from 300 mg twice daily to 400 mg three times daily.  Discussed this is tiny dose compared to what we can do.    Try rizatriptan 2 tabs at a time to see if this is more effective.  Goal is to get rid of headache enough to not need further doses or as many doses later in day.  Long term this medication will likely stop working if taking 3 times a day every day.  Amitriptyline increase, gabapentin increase, and methylprednisolone hopefully will decrease headaches.    Methylprednisolone (medrol dose radha) - short term med that may break headaches.  Won't see benefit on day 1.  Can upset stomach.  Possible short term side effects: very hungry, very bell/irritable, feeling jittery, poor sleep.    Return to occupational therapy (perhaps a bit more hand specialized than Physical Therapy) and to your ortho specialist     Notify psych prescriber of med changes.  Also ask about cymbalta as medication to possibly help nerve pain.    Follow up 1 month     With increase in amitriptyline, gabapentin, and possible increase in rizatriptan - watch for serotonin syndrome   Please know there is a risk of serotonin syndrome.  Symptoms of serotonin syndrome: flushing, fever, very dry mouth, headache, tremor, confusion, agitation, stiff muscles, restlessness, enlarged pupils, unusual eye movements back and forth, rapid heart rate, nausea and vomiting, extreme reflexes, etc.  You need to be seen immediately if you develop these sort of symptoms, even if you do not have all of these symptoms.  This can be life threatening.

## 2023-02-27 NOTE — PATIENT INSTRUCTIONS
Continue all medications.     Start propranolol.    Follow-up with Pain Management and your old Orthopedist to see if they have any recommendations/evaluation of your pain.

## 2023-02-27 NOTE — NURSING NOTE
"Chief Complaint   Patient presents with     Headache       Initial There were no vitals taken for this visit. Estimated body mass index is 30.63 kg/m  as calculated from the following:    Height as of 6/28/22: 1.74 m (5' 8.5\").    Weight as of 7/26/22: 92.7 kg (204 lb 6.4 oz).    Patient presents to the clinic using     Is there anyone who you would like to be able to receive your results?   If yes have patient fill out [facility]    "

## 2023-02-27 NOTE — PROGRESS NOTES
Jonathan is a 27 year old who is being evaluated via a billable video visit.      How would you like to obtain your AVS? Mail  If the video visit is dropped, the invitation should be resent by: Text to cell phone: [phone]  Will anyone else be joining your video visit? No  Assessment & Plan   Chronic right-sided low back pain without sciatica  Chronic pain syndrome  Will continue current doses of medicine as they have been somewhat helpful for him.  - celecoxib (CELEBREX) 200 MG capsule; Take 1 capsule (200 mg) by mouth daily  - gabapentin (NEURONTIN) 400 MG capsule; Take 1 capsule (400 mg) by mouth 4 times daily Day 1 take as 1 cap twice daily.  Day 2 can increase to 1 cap 3 times daily.    Chronic tension-type headache, not intractable  Improved with higher doses of Amitriptyline. Will add on propranolol since he is still having headaches daily. Refilled Rizatriptyan. Will likely refer to Neurology if symptoms still cannot be controlled well with propranolol.   - propranolol ER (INDERAL LA) 60 MG 24 hr capsule; Take 1 capsule (60 mg) by mouth daily  - rizatriptan (MAXALT) 5 MG tablet; Take 1-2 tablets (5-10 mg) by mouth at onset of headache for migraine May repeat in 2 hours. Max 6 tablets/24 hours.  - amitriptyline (ELAVIL) 150 MG tablet; Take 1 tablet (150 mg) by mouth At Bedtime    History non-traumatic compartment syndrome of right hand  Pain in the right hand is worsening over the last few months. Maybe this is CRPS? Refer back to Ortho and to Pain Management for their expertise with these kinds of injuries. Continue all non-nacrotic options at this time. Pt did ask for Tramadol but I think he needs further evaluation of his pain.  - Pain Management  Referral; Future  - celecoxib (CELEBREX) 200 MG capsule; Take 1 capsule (200 mg) by mouth daily    Panic disorder with agoraphobia and moderate panic attacks  Perhaps propranolol can help with this. It does not appear he has been on this in the past.  "Will follow-up in 1 month for recheck.   - propranolol ER (INDERAL LA) 60 MG 24 hr capsule; Take 1 capsule (60 mg) by mouth daily  - gabapentin (NEURONTIN) 400 MG capsule; Take 1 capsule (400 mg) by mouth 4 times daily Day 1 take as 1 cap twice daily.  Day 2 can increase to 1 cap 3 times daily.    BMI:   Estimated body mass index is 30.63 kg/m  as calculated from the following:    Height as of 6/28/22: 1.74 m (5' 8.5\").    Weight as of 7/26/22: 92.7 kg (204 lb 6.4 oz).     Return in about 4 weeks (around 3/27/2023), or headache recheck, for In-Clinic Visit.    Garrett Barth PA-C  [facility]    Lashell Castillo is a 27 year old, presenting for the following health issues:  Headache and Hand Pain    History of Present Illness       Headaches:   Since the patient's last clinic visit, headaches are: worsened  The patient is getting headaches:  Every day all day  He is not able to do normal daily activities when he has a migraine.  The patient is taking the following rescue/relief medications:  Ibuprofen (Advil, Motrin) and other   Patient states \"The relief is inconsistent\" from the rescue/relief medications.   The patient is taking the following medications to prevent migraines:  No medications to prevent migraines and other  In the past 4 weeks, the patient has gone to an Urgent Care or Emergency Room 0 times times due to headaches.    He eats 0-1 servings of fruits and vegetables daily.He consumes 5 sweetened beverage(s) daily.He exercises with enough effort to increase his heart rate 30 to 60 minutes per day.  He exercises with enough effort to increase his heart rate 7 days per week.   He is taking medications regularly.   Making him nauseated     Concern - Rt hand  Onset: Compartment syndrome since 2022  Description: Rt hand   Intensity: 7/10  Progression of Symptoms:  waxing and waning  Accompanying Signs & Symptoms: Pain  Previous history of similar problem: yes  Precipitating " factors:        Worsened by: Lifting or certain hand motions  Alleviating factors:        Improved by:   Therapies tried and outcome:  none     Review of Systems   See HPI         Objective       Vitals:  No vitals were obtained today due to virtual visit.    Physical Exam   GENERAL: Healthy, alert and no distress  EYES: Eyes grossly normal to inspection.  No discharge or erythema, or obvious scleral/conjunctival abnormalities.  RESP: No audible wheeze, cough, or visible cyanosis.  No visible retractions or increased work of breathing.    SKIN: Visible skin clear. No significant rash, abnormal pigmentation or lesions.  NEURO: Cranial nerves grossly intact.  Mentation and speech appropriate for age.  PSYCH: Mentation appears normal, affect normal/bright, judgement and insight intact, normal speech and appearance well-groomed.      Video-Visit Details    Type of service:  Video Visit   Video Start Time: 1:50 PM  Video End Time:2:08 PM    Originating Location (pt. Location): Home  Distant Location (provider location):  On-site  Platform used for Video Visit: Lockr
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
## 2023-03-08 NOTE — PATIENT INSTRUCTIONS
Pain Physical Therapy:  YES   I am referring for targeted stretching, strengthening and home exercise plan for neck and right arm pain.     Pain Psychologist to address relaxation, behavioral change, coping style, and other factors important to improvement.  NO - He is currently working with mental health team.     Diagnostic Studies:  Reviewed right hand xray from 2021 ([facility] - [facility])    Medication Management:   Stop Flexeril. Start methocarbamol 500-1000 mg up to four times daily as needed for muscle spasm and pain. Start with 1 tab until you know effects of medication, careful driving, avoid concurrent alcohol use.   Apply topical lidocaine 5% to right hand three times daily as needed. If insurance does not cover this, you can purchase 4% strength over the counter. May also explore other topical analgesics available (speak with pharmacist regarding available products if needed).     Potential procedures:   Deferred - may consider ONB/TPI in future, pending outcome from initial therapies.     Other Orders/Referrals:   TENS unit - work with medical supply store listed on order to obtain device.     Follow up with TAVON De León CNP in 8 weeks, or sooner if needed.       ----------------------------------------------------------------  Clinic Number:  [phone]   Call with any questions about your care and for scheduling assistance.   Calls are returned Monday through Friday between 8 AM and 4:30 PM. We usually get back to you within 2 business days depending on the issue/request.    If we are prescribing your medications:  For opioid medication refills, call the clinic or send a Engage message 7 days in advance.  Please include:  Name of requested medication  Name of the pharmacy.  For non-opioid medications, call your pharmacy directly to request a refill. Please allow 3-4 days to be processed.   Per [place] State Law:  All controlled substance prescriptions must be filled within 30 days of being written.    For  those controlled substances allowing refills, pickup must occur within 30 days of last fill.      We believe regular attendance is key to your success in our program!    Any time you are unable to keep your appointment we ask that you call us at least 24 hours in advance to cancel.This will allow us to offer the appointment time to another patient.   Multiple missed appointments may lead to dismissal from the clinic.

## 2023-05-16 NOTE — TELEPHONE ENCOUNTER
Received fax from pharmacy.     Patient requesting Meloxicam 15mg tablets.     Preferred Pharmacy:   [id number] - [place], [place] - [address] [place] [address] AT [facility] & [address]  [address] [facility] [address]  Phone: [phone] Fax: [phone]      Could we send this information to you in [place] [place] or would you prefer to receive a phone call?:   Patient would prefer a phone call   Okay to leave a detailed message?: Yes at Home number on file [phone] (home)
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
## 2023-05-19 NOTE — PATIENT INSTRUCTIONS
For further treatment of your headaches and neck pain, I have placed a referral to physical therapy.  Physical therapy has many great modalities they can use to help with pain management.  You have been provided with refills of cyclobenzaprine/Flexeril and meloxicam/Mobic.  As you are taking these medications, do not take Celebrex or Robaxin as these are medications in the same family.    Please schedule a follow-up visit with your primary care provider for ongoing management of your symptoms.  Additionally, please continue with concussion clinic as this will also be very helpful.

## 2023-06-01 NOTE — TELEPHONE ENCOUNTER
Plan limits exceeded--per ins.    Please explain current dose, qty and days supply for a prior auth.    Ins  # [phone]  Pt ID# [id number]

## 2023-06-07 NOTE — TELEPHONE ENCOUNTER
Pt has severe anxiety disorder, panic disorder and chronic pain syndrome. He has failed lower doses of gabapentin. He is currently taking Clonazepam, Depakote, Lamictal, Amitriptyline and Zyprexa. He has failed prozac and NSAID medicines.     Pt is currently seeing Psychiatry as well.     Garrett Barth PA-C

## 2023-06-09 NOTE — TELEPHONE ENCOUNTER
PA request sent via [id number]--waiting for reply.    [facility] is reviewing your PA request and will respond within 24 hours for Medicaid or up to 72 hours for non-Medicaid plans, based on the required timeframe determined by state or federal regulations. To check for an update later, open this request from your dashboard.

## 2023-06-15 NOTE — TELEPHONE ENCOUNTER
Patient needs follow-up with pain clinic or PCP for additional refills.    Avis Jensen PA-C on 6/15/2023 at 1:43 PM

## 2023-07-03 NOTE — TELEPHONE ENCOUNTER
CaseId:[id number];Status:Approved;Review Type:Qty;Coverage Start Date:05/10/2023;Coverage End Date:06/08/2024;

## 2023-07-12 DIAGNOSIS — R51.9 NONINTRACTABLE EPISODIC HEADACHE, UNSPECIFIED HEADACHE TYPE: ICD-10-CM

## 2023-07-12 DIAGNOSIS — M54.2 NECK PAIN: ICD-10-CM

## 2023-07-12 NOTE — TELEPHONE ENCOUNTER
"Please contact patient.     He needs an appointment to \"Establish Care\" and Avis Jensen PA-C needs to be removed at PCP.     Joslyn Pierre RN BSN  [facility]    "

## 2023-07-14 RX ORDER — MELOXICAM 15 MG/1
15 TABLET ORAL DAILY
Qty: 30 TABLET | Refills: 0 | Status: SHIPPED | OUTPATIENT
Start: 2023-07-14

## 2023-07-14 NOTE — TELEPHONE ENCOUNTER
Patient needs to establish PCP for ongoing refills. One jose refill provided.  Avis Jensen PA-C on 7/14/2023 at 3:40 PM

## 2023-07-19 ENCOUNTER — LAB REQUISITION (OUTPATIENT)
Dept: LAB | Facility: CLINIC | Age: 28
End: 2023-07-19

## 2023-07-25 ENCOUNTER — VIRTUAL VISIT (OUTPATIENT)
Dept: PALLIATIVE MEDICINE | Facility: CLINIC | Age: 28
End: 2023-07-25
Payer: COMMERCIAL

## 2023-07-25 PROCEDURE — 99207 PR NO BILLABLE SERVICE THIS VISIT: CPT

## 2023-07-25 NOTE — PROGRESS NOTES
Patient scheduled for appointment with me today. As I began to prepare my note for the visit, I reviewed ED note from 23. It appears patient is now . I will message our scheduling supervisor to assist with marking chart accordingly.    Yulisa Tate DNP, APRN, AGNP-C  [facility]

## 2023-08-12 PROCEDURE — 88341 IMHCHEM/IMCYTCHM EA ADD ANTB: CPT | Mod: TC | Performed by: SPECIALIST

## 2023-08-24 LAB
PATH REPORT.COMMENTS IMP SPEC: NORMAL
PATH REPORT.FINAL DX SPEC: NORMAL
PATH REPORT.GROSS SPEC: NORMAL
PATH REPORT.MICROSCOPIC SPEC OTHER STN: NORMAL
PATH REPORT.MICROSCOPIC SPEC OTHER STN: NORMAL
PATH REPORT.RELEVANT HX SPEC: NORMAL
PATH REPORT.RELEVANT HX SPEC: NORMAL

## 2024-06-21 NOTE — TELEPHONE ENCOUNTER
Additional request for refill of bupropion.  Patient had requested on 6/16/24 via [employer] message.    Pharmacy sending as well.      Physical re-scheduled for 12/23/24 from 7/16/24.  Odalis, Phyllis    Last appointment: [facility] 8/29/23, [facility] 7/24/23 [place]  Next appointment: 12/23/24 [place]  Previous refill encounter(s): 10/24/23 90    Requested Prescriptions     Pending Prescriptions Disp Refills    buPROPion (WELLBUTRIN XL) 300 MG extended release tablet [Pharmacy Med Name: BUPROPION HCL  MG TABLET] 90 tablet 0     Sig: Take 1 tablet by mouth daily     For Pharmacy Admin Tracking Only    Program: Medication Refill  CPA in place:    Recommendation Provided To:   Intervention Detail: New Rx: 1, reason: Patient Preference  Intervention Accepted By:   Gap Closed?:    Time Spent (min): 5     PT WILL CALL BACK.  Diamond Lindo.  Thank you!